# Patient Record
Sex: MALE | Race: WHITE | NOT HISPANIC OR LATINO | Employment: STUDENT | URBAN - METROPOLITAN AREA
[De-identification: names, ages, dates, MRNs, and addresses within clinical notes are randomized per-mention and may not be internally consistent; named-entity substitution may affect disease eponyms.]

---

## 2024-06-03 ENCOUNTER — OFFICE VISIT (OUTPATIENT)
Dept: FAMILY MEDICINE CLINIC | Facility: CLINIC | Age: 19
End: 2024-06-03
Payer: COMMERCIAL

## 2024-06-03 VITALS
HEIGHT: 71 IN | WEIGHT: 195.2 LBS | SYSTOLIC BLOOD PRESSURE: 111 MMHG | DIASTOLIC BLOOD PRESSURE: 75 MMHG | TEMPERATURE: 98.2 F | BODY MASS INDEX: 27.33 KG/M2 | OXYGEN SATURATION: 97 % | HEART RATE: 90 BPM

## 2024-06-03 DIAGNOSIS — Z00.01 ENCOUNTER FOR ANNUAL GENERAL MEDICAL EXAMINATION WITH ABNORMAL FINDINGS IN ADULT: Primary | ICD-10-CM

## 2024-06-03 DIAGNOSIS — Z13.220 SCREENING CHOLESTEROL LEVEL: ICD-10-CM

## 2024-06-03 DIAGNOSIS — R00.0 TACHYCARDIA, UNSPECIFIED: ICD-10-CM

## 2024-06-03 DIAGNOSIS — Z13.1 SCREENING FOR DIABETES MELLITUS: ICD-10-CM

## 2024-06-03 DIAGNOSIS — D64.89 ANEMIA DUE TO OTHER CAUSE, NOT CLASSIFIED: ICD-10-CM

## 2024-06-03 DIAGNOSIS — Z23 NEED FOR VACCINATION: ICD-10-CM

## 2024-06-03 DIAGNOSIS — Z11.4 SCREENING FOR HIV (HUMAN IMMUNODEFICIENCY VIRUS): ICD-10-CM

## 2024-06-03 DIAGNOSIS — Z11.59 NEED FOR HEPATITIS C SCREENING TEST: ICD-10-CM

## 2024-06-03 DIAGNOSIS — E55.9 VITAMIN D DEFICIENCY: ICD-10-CM

## 2024-06-03 DIAGNOSIS — K58.0 IRRITABLE BOWEL SYNDROME WITH DIARRHEA: ICD-10-CM

## 2024-06-03 DIAGNOSIS — R11.0 NAUSEA: ICD-10-CM

## 2024-06-03 DIAGNOSIS — R19.7 DIARRHEA, UNSPECIFIED TYPE: ICD-10-CM

## 2024-06-03 PROBLEM — R53.83 FATIGUE: Status: ACTIVE | Noted: 2024-06-03

## 2024-06-03 PROBLEM — H52.10 MYOPIA: Status: ACTIVE | Noted: 2017-04-26

## 2024-06-03 PROCEDURE — 99385 PREV VISIT NEW AGE 18-39: CPT | Performed by: PREVENTIVE MEDICINE

## 2024-06-03 PROCEDURE — 99213 OFFICE O/P EST LOW 20 MIN: CPT | Performed by: PREVENTIVE MEDICINE

## 2024-06-03 NOTE — PATIENT INSTRUCTIONS
Health Maintenance Due   Topic Date Due    HPV VACCINES (3 - Male 2-dose series) 01/18/2017    COVID-19 Vaccine (4 - 2023-24 season) 09/01/2023    HEPATITIS C SCREENING  Never done    ANNUAL PHYSICAL  Never done    12 hour fast for labs

## 2024-06-03 NOTE — PROGRESS NOTES
Subjective   César Tejada is a 18 y.o. male presents for   Chief Complaint   Patient presents with    Annual Exam     Concerned with anemia has never seen Gastro    Establish Care    Diarrhea     Concerned with IBS       Health Maintenance Due   Topic Date Due    HPV VACCINES (3 - Male 2-dose series) 01/18/2017    COVID-19 Vaccine (4 - 2023-24 season) 09/01/2023    HEPATITIS C SCREENING  Never done    ANNUAL PHYSICAL  Never done   Patient was new to us for establishing care and age-specific physical he was advised to wear sunscreen and a seatbelt.    Patient also is complaining of several.'s of diarrhea without blood in his stools he thought at first that it was just at times when he was anxious but now he realizes that it is occurring most of the time he has not had any weight loss there is no family history of IBS but he would like to see the gastroenterologist to see if he could get a diagnosis he has tried to cut various things like gluten lactose carbohydrates out of his diet and this is really not seem to make much difference he is also try to increase his fiber in the form of spinach and this has made no progress as well in the meantime he will try to increase fiber in the form of fiber bars to see if that will decrease his diarrhea.  We will also get some labs and if we fail to find a cause for his problems will consider due to his chest pain referring him to cardiology.    Diarrhea        History of Present Illness  The patient is a 18-year-old male who is here today to establish care for his annual wellness exam, nausea, diarrhea, need for hepatitis C screening, need for vaccination, screening for HIV, screening for cholesterol, diabetes, deficiency of vitamin D, also anemia and irritable bowel syndrome, and also body mass index of 27.    The patient reports experiencing diarrhea, nausea, and gas, which have progressively worsened over the past year. He denies any alterations in hearing or vision since  "his last medical consultation. His prescription has remained consistent, and he denies any sore throat. He denies any constipation, hematochezia, or melena. He suspects he may have irritable bowel syndrome, although he has not been formally diagnosed or consulted a gastroenterologist. His condition has worsened compared to the previous year. He attempted to increase his fiber intake during his college years, but to no avail. He also reports frequent chills, gas, indigestion, and nausea. He suspects he may have anemia due to frequent diarrhea, fatigue, clamminess, and tachycardia, which he first noticed a few years ago. He initially attributed these symptoms to stress, but later realized that they occur even when he is not under stress. He denies any chest pressure or palpitations. He has never required hospitalization, but has undergone two surgeries. He underwent an osteochondroma removal from his left underarm when he was 11 years old, and had his wisdom teeth removed last summer. He has never received a blood transfusion. He used to exercise, but currently does not due to fatigue that began in 12/2023.   His father is 45 and in good health. His mother is 47 in fair health. She has likely a hiatal hernia, but they have not gotten that out yet. He has 1 sister who is 17 and in good health. He denies any family history of heart disease.   The patient has no known drug allergies.   He is up-to-date on his tetanus vaccine.    Vitals:    06/03/24 1403 06/03/24 1404   BP: 118/79 111/75   BP Location: Left arm Right arm   Patient Position: Sitting Sitting   Cuff Size: Adult Adult   Pulse: 82 90   Temp: 98.2 °F (36.8 °C)    TempSrc: Temporal    SpO2: 97% 97%   Weight: 88.5 kg (195 lb 3.2 oz)    Height: 180.3 cm (71\")      Body mass index is 27.22 kg/m².    No current outpatient medications on file prior to visit.     No current facility-administered medications on file prior to visit.       The following portions of the " patient's history were reviewed and updated as appropriate: allergies, current medications, past family history, past medical history, past social history, past surgical history, and problem list.    Review of Systems   Respiratory:  Positive for chest tightness.    Cardiovascular:  Positive for chest pain.   Gastrointestinal:  Positive for diarrhea, nausea, GERD and indigestion.       Objective   Physical Exam  Vitals reviewed.   Constitutional:       General: He is not in acute distress.     Appearance: Normal appearance. He is well-developed. He is not ill-appearing or toxic-appearing.   HENT:      Head: Normocephalic and atraumatic.      Right Ear: Tympanic membrane, ear canal and external ear normal.      Left Ear: Tympanic membrane, ear canal and external ear normal.      Nose: Nose normal.      Mouth/Throat:      Mouth: Mucous membranes are moist.      Pharynx: No posterior oropharyngeal erythema.   Eyes:      Extraocular Movements: Extraocular movements intact.      Conjunctiva/sclera: Conjunctivae normal.      Pupils: Pupils are equal, round, and reactive to light.   Cardiovascular:      Rate and Rhythm: Normal rate and regular rhythm.      Heart sounds: Normal heart sounds.   Pulmonary:      Effort: Pulmonary effort is normal.      Breath sounds: Normal breath sounds.   Abdominal:      General: Bowel sounds are normal. There is no distension.      Palpations: Abdomen is soft. There is no mass.      Tenderness: There is no abdominal tenderness.   Musculoskeletal:         General: Normal range of motion.      Cervical back: Neck supple.   Skin:     General: Skin is warm.   Neurological:      General: No focal deficit present.      Mental Status: He is alert and oriented to person, place, and time.   Psychiatric:         Mood and Affect: Mood normal.         Behavior: Behavior normal.       Physical Exam  Vital Signs  Body mass index is 27.    PHQ-9 Total Score: 0  Results           Assessment & Plan    Diagnoses and all orders for this visit:    1. Encounter for annual general medical examination with abnormal findings in adult (Primary)  -     CBC Auto Differential; Future    2. Need for vaccination    3. Need for hepatitis C screening test  -     Hepatitis C Antibody; Future    4. Diarrhea, unspecified type    5. Nausea    6. Screening for HIV (human immunodeficiency virus)  -     HIV-1 / O / 2 Ag / Antibody; Future    7. Screening cholesterol level  -     Lipid Panel; Future    8. Screening for diabetes mellitus  -     Comprehensive Metabolic Panel; Future    9. Vitamin D deficiency  -     Vitamin D,25-Hydroxy; Future    10. Anemia due to other cause, not classified  -     Vitamin B12; Future    11. Irritable bowel syndrome with diarrhea  -     Ambulatory Referral to Gastroenterology    12. Body mass index (BMI) 27.0-27.9, adult    13. Tachycardia, unspecified  -     TSH; Future  -     Magnesium; Future  -     C-reactive Protein; Future  -     Sedimentation Rate; Future      Assessment & Plan  1. Irritable bowel syndrome.  A referral to a gastroenterologist has been made for further evaluation.    2. Fatigue and high resting heart rate.  Thyroid, B12, magnesium, C-reactive protein, and sedimentation rate tests have been ordered.    Follow-up  A follow-up appointment is scheduled for 6 weeks from now.    Patient Instructions     Health Maintenance Due   Topic Date Due    HPV VACCINES (3 - Male 2-dose series) 01/18/2017    COVID-19 Vaccine (4 - 2023-24 season) 09/01/2023    HEPATITIS C SCREENING  Never done    ANNUAL PHYSICAL  Never done    12 hour fast for labs         Patient or patient representative verbalized consent for the use of Ambient Listening during the visit with  Gabriela Carvajal MD for chart documentation. 6/3/2024  20:01 EDT

## 2024-07-19 ENCOUNTER — LAB (OUTPATIENT)
Dept: FAMILY MEDICINE CLINIC | Facility: CLINIC | Age: 19
End: 2024-07-19
Payer: COMMERCIAL

## 2024-07-19 DIAGNOSIS — D64.89 ANEMIA DUE TO OTHER CAUSE, NOT CLASSIFIED: ICD-10-CM

## 2024-07-19 DIAGNOSIS — Z13.220 SCREENING CHOLESTEROL LEVEL: ICD-10-CM

## 2024-07-19 DIAGNOSIS — Z00.01 ENCOUNTER FOR ANNUAL GENERAL MEDICAL EXAMINATION WITH ABNORMAL FINDINGS IN ADULT: ICD-10-CM

## 2024-07-19 DIAGNOSIS — E55.9 VITAMIN D DEFICIENCY: ICD-10-CM

## 2024-07-19 DIAGNOSIS — Z13.1 SCREENING FOR DIABETES MELLITUS: ICD-10-CM

## 2024-07-19 DIAGNOSIS — R00.0 TACHYCARDIA, UNSPECIFIED: ICD-10-CM

## 2024-07-19 DIAGNOSIS — Z11.4 SCREENING FOR HIV (HUMAN IMMUNODEFICIENCY VIRUS): ICD-10-CM

## 2024-07-19 LAB
ALBUMIN SERPL-MCNC: 4.6 G/DL (ref 3.5–5.2)
ALBUMIN/GLOB SERPL: 1.8 G/DL
ALP SERPL-CCNC: 96 U/L (ref 39–117)
ALT SERPL W P-5'-P-CCNC: 41 U/L (ref 1–41)
ANION GAP SERPL CALCULATED.3IONS-SCNC: 10 MMOL/L (ref 5–15)
AST SERPL-CCNC: 30 U/L (ref 1–40)
BASOPHILS # BLD AUTO: 0.04 10*3/MM3 (ref 0–0.2)
BASOPHILS NFR BLD AUTO: 0.8 % (ref 0–1.5)
BILIRUB SERPL-MCNC: 0.8 MG/DL (ref 0–1.2)
BUN SERPL-MCNC: 12 MG/DL (ref 6–20)
BUN/CREAT SERPL: 11.3 (ref 7–25)
CALCIUM SPEC-SCNC: 9.7 MG/DL (ref 8.6–10.5)
CHLORIDE SERPL-SCNC: 103 MMOL/L (ref 98–107)
CHOLEST SERPL-MCNC: 125 MG/DL (ref 0–200)
CO2 SERPL-SCNC: 27 MMOL/L (ref 22–29)
CREAT SERPL-MCNC: 1.06 MG/DL (ref 0.76–1.27)
CRP SERPL-MCNC: <0.3 MG/DL (ref 0–0.5)
DEPRECATED RDW RBC AUTO: 39 FL (ref 37–54)
EGFRCR SERPLBLD CKD-EPI 2021: 103.7 ML/MIN/1.73
EOSINOPHIL # BLD AUTO: 0.08 10*3/MM3 (ref 0–0.4)
EOSINOPHIL NFR BLD AUTO: 1.5 % (ref 0.3–6.2)
ERYTHROCYTE [DISTWIDTH] IN BLOOD BY AUTOMATED COUNT: 12.6 % (ref 12.3–15.4)
ERYTHROCYTE [SEDIMENTATION RATE] IN BLOOD: <1 MM/HR (ref 0–15)
GLOBULIN UR ELPH-MCNC: 2.5 GM/DL
GLUCOSE SERPL-MCNC: 76 MG/DL (ref 65–99)
HCT VFR BLD AUTO: 50.1 % (ref 37.5–51)
HDLC SERPL-MCNC: 49 MG/DL (ref 40–60)
HGB BLD-MCNC: 17.4 G/DL (ref 13–17.7)
IMM GRANULOCYTES # BLD AUTO: 0.01 10*3/MM3 (ref 0–0.05)
IMM GRANULOCYTES NFR BLD AUTO: 0.2 % (ref 0–0.5)
LDLC SERPL CALC-MCNC: 62 MG/DL (ref 0–100)
LDLC/HDLC SERPL: 1.28 {RATIO}
LYMPHOCYTES # BLD AUTO: 2.39 10*3/MM3 (ref 0.7–3.1)
LYMPHOCYTES NFR BLD AUTO: 45.6 % (ref 19.6–45.3)
MAGNESIUM SERPL-MCNC: 2.5 MG/DL (ref 1.7–2.2)
MCH RBC QN AUTO: 30.1 PG (ref 26.6–33)
MCHC RBC AUTO-ENTMCNC: 34.7 G/DL (ref 31.5–35.7)
MCV RBC AUTO: 86.5 FL (ref 79–97)
MONOCYTES # BLD AUTO: 0.53 10*3/MM3 (ref 0.1–0.9)
MONOCYTES NFR BLD AUTO: 10.1 % (ref 5–12)
NEUTROPHILS NFR BLD AUTO: 2.19 10*3/MM3 (ref 1.7–7)
NEUTROPHILS NFR BLD AUTO: 41.8 % (ref 42.7–76)
NRBC BLD AUTO-RTO: 0 /100 WBC (ref 0–0.2)
PLATELET # BLD AUTO: 199 10*3/MM3 (ref 140–450)
PMV BLD AUTO: 11.2 FL (ref 6–12)
POTASSIUM SERPL-SCNC: 4.1 MMOL/L (ref 3.5–5.2)
PROT SERPL-MCNC: 7.1 G/DL (ref 6–8.5)
RBC # BLD AUTO: 5.79 10*6/MM3 (ref 4.14–5.8)
SODIUM SERPL-SCNC: 140 MMOL/L (ref 136–145)
TRIGL SERPL-MCNC: 66 MG/DL (ref 0–150)
TSH SERPL DL<=0.05 MIU/L-ACNC: 5.33 UIU/ML (ref 0.27–4.2)
VLDLC SERPL-MCNC: 14 MG/DL (ref 5–40)
WBC NRBC COR # BLD AUTO: 5.24 10*3/MM3 (ref 3.4–10.8)

## 2024-07-19 PROCEDURE — 83735 ASSAY OF MAGNESIUM: CPT | Performed by: PREVENTIVE MEDICINE

## 2024-07-19 PROCEDURE — 86140 C-REACTIVE PROTEIN: CPT | Performed by: PREVENTIVE MEDICINE

## 2024-07-19 PROCEDURE — 84439 ASSAY OF FREE THYROXINE: CPT | Performed by: PREVENTIVE MEDICINE

## 2024-07-19 PROCEDURE — 80061 LIPID PANEL: CPT | Performed by: PREVENTIVE MEDICINE

## 2024-07-19 PROCEDURE — 85025 COMPLETE CBC W/AUTO DIFF WBC: CPT | Performed by: PREVENTIVE MEDICINE

## 2024-07-19 PROCEDURE — 82306 VITAMIN D 25 HYDROXY: CPT | Performed by: PREVENTIVE MEDICINE

## 2024-07-19 PROCEDURE — 84443 ASSAY THYROID STIM HORMONE: CPT | Performed by: PREVENTIVE MEDICINE

## 2024-07-19 PROCEDURE — 80053 COMPREHEN METABOLIC PANEL: CPT | Performed by: PREVENTIVE MEDICINE

## 2024-07-19 PROCEDURE — 85652 RBC SED RATE AUTOMATED: CPT | Performed by: PREVENTIVE MEDICINE

## 2024-07-19 PROCEDURE — 82607 VITAMIN B-12: CPT | Performed by: PREVENTIVE MEDICINE

## 2024-07-19 PROCEDURE — G0432 EIA HIV-1/HIV-2 SCREEN: HCPCS | Performed by: PREVENTIVE MEDICINE

## 2024-07-20 DIAGNOSIS — E03.9 HYPOTHYROIDISM, UNSPECIFIED TYPE: Primary | ICD-10-CM

## 2024-07-20 LAB
25(OH)D3 SERPL-MCNC: 23.6 NG/ML (ref 30–100)
HIV 1+2 AB+HIV1 P24 AG SERPL QL IA: NORMAL
T4 FREE SERPL-MCNC: 1.45 NG/DL (ref 0.92–1.68)
VIT B12 BLD-MCNC: 523 PG/ML (ref 211–946)

## 2024-07-21 NOTE — PROGRESS NOTES
Vitamin D is slightly low so increase to 1000 units a day or 2/week.  Magnesium is slightly elevated if you are taking extra over-the-counter I would decrease your dose a day or 2/week finally your TSH is elevated but the active ingredient called T4 is normal so we will not consider any thyroid medication presently however this should be monitored again in 3 to 6 months call if any other questions or concerns.

## 2024-07-22 ENCOUNTER — TELEPHONE (OUTPATIENT)
Dept: FAMILY MEDICINE CLINIC | Facility: CLINIC | Age: 19
End: 2024-07-22
Payer: COMMERCIAL

## 2024-07-22 NOTE — TELEPHONE ENCOUNTER
"Relay     \"Vitamin D is slightly low so increase to 1000 units a day or 2/week.  Magnesium is slightly elevated if you are taking extra over-the-counter I would decrease your dose a day or 2/week finally your TSH is elevated but the active ingredient called T4 is normal so we will not consider any thyroid medication presently however this should be monitored again in 3 to 6 months call if any other questions or concerns.\"                "

## 2024-07-22 NOTE — TELEPHONE ENCOUNTER
Patient advised of lab results. Verbalized understanding. No questions or concerns at this time.

## 2024-08-06 ENCOUNTER — OFFICE (OUTPATIENT)
Dept: URBAN - METROPOLITAN AREA CLINIC 64 | Facility: CLINIC | Age: 19
End: 2024-08-06
Payer: COMMERCIAL

## 2024-08-06 VITALS
WEIGHT: 188 LBS | DIASTOLIC BLOOD PRESSURE: 95 MMHG | HEART RATE: 87 BPM | BODY MASS INDEX: 26.32 KG/M2 | HEIGHT: 71 IN | SYSTOLIC BLOOD PRESSURE: 145 MMHG

## 2024-08-06 DIAGNOSIS — R19.7 DIARRHEA, UNSPECIFIED: ICD-10-CM

## 2024-08-06 PROCEDURE — 99203 OFFICE O/P NEW LOW 30 MIN: CPT | Performed by: INTERNAL MEDICINE

## 2024-08-06 RX ORDER — LOPERAMIDE HCL 2 MG
2 TABLET ORAL
Qty: 30 | Refills: 11 | Status: ACTIVE
Start: 2024-08-06

## 2024-08-08 PROBLEM — E83.41 HYPERMAGNESEMIA: Status: ACTIVE | Noted: 2024-08-08

## 2024-08-08 PROBLEM — E55.9 VITAMIN D DEFICIENCY: Status: ACTIVE | Noted: 2024-08-08

## 2024-08-08 NOTE — PATIENT INSTRUCTIONS
Health Maintenance Due   Topic Date Due    HPV VACCINES (3 - Male 2-dose series) 01/18/2017    COVID-19 Vaccine (4 - 2023-24 season) 09/01/2023    HEPATITIS C SCREENING  Never done    INFLUENZA VACCINE  08/01/2024

## 2024-08-09 ENCOUNTER — OFFICE VISIT (OUTPATIENT)
Dept: FAMILY MEDICINE CLINIC | Facility: CLINIC | Age: 19
End: 2024-08-09
Payer: COMMERCIAL

## 2024-08-09 VITALS
OXYGEN SATURATION: 96 % | DIASTOLIC BLOOD PRESSURE: 76 MMHG | HEART RATE: 85 BPM | TEMPERATURE: 97 F | WEIGHT: 185.6 LBS | HEIGHT: 71 IN | BODY MASS INDEX: 25.98 KG/M2 | SYSTOLIC BLOOD PRESSURE: 109 MMHG

## 2024-08-09 DIAGNOSIS — E55.9 VITAMIN D DEFICIENCY: ICD-10-CM

## 2024-08-09 DIAGNOSIS — R00.0 TACHYCARDIA, UNSPECIFIED: ICD-10-CM

## 2024-08-09 DIAGNOSIS — R53.83 OTHER FATIGUE: ICD-10-CM

## 2024-08-09 DIAGNOSIS — K58.0 IRRITABLE BOWEL SYNDROME WITH DIARRHEA: Primary | ICD-10-CM

## 2024-08-09 DIAGNOSIS — E66.3 OVERWEIGHT WITH BODY MASS INDEX (BMI) OF 25 TO 25.9 IN ADULT: ICD-10-CM

## 2024-08-09 DIAGNOSIS — Z01.83 ENCOUNTER FOR BLOOD TYPING: ICD-10-CM

## 2024-08-09 DIAGNOSIS — E83.41 HYPERMAGNESEMIA: ICD-10-CM

## 2024-08-09 PROCEDURE — 99213 OFFICE O/P EST LOW 20 MIN: CPT | Performed by: PREVENTIVE MEDICINE

## 2024-08-09 RX ORDER — LOPERAMIDE HYDROCHLORIDE 2 MG/1
2 TABLET ORAL
COMMUNITY
Start: 2024-08-06

## 2024-08-10 NOTE — PROGRESS NOTES
"Jenna Tejada is a 18 y.o. male presents for   Chief Complaint   Patient presents with    Irritable Bowel Syndrome     Has improved.  GI put on new medications.         Health Maintenance Due   Topic Date Due    HPV VACCINES (3 - Male 2-dose series) 01/18/2017    COVID-19 Vaccine (4 - 2023-24 season) 09/01/2023    HEPATITIS C SCREENING  Never done    INFLUENZA VACCINE  08/01/2024       Irritable Bowel Syndrome  Pertinent negatives include no fatigue.      History of Present Illness  The patient is a 18-year-old male who is here today to follow up on irritable bowel syndrome with diarrhea, hypermagnesemia, vitamin D deficiency, tachycardia, fatigue, and overweight with a body mass index of 25.    He has been prescribed Imodium, which he reports has been effective in managing his symptoms. He has been advised to continue Imodium, and he has been adhering to a healthier diet. He has noticed a slight weight loss, which he attributes to resuming work. He denies any presence of blood in his stools.    Vitals:    08/09/24 1341 08/09/24 1342   BP: 110/76 109/76   BP Location: Right arm Left arm   Patient Position: Sitting Sitting   Cuff Size: Adult Adult   Pulse: 76 85   Temp: 97 °F (36.1 °C)    TempSrc: Temporal    SpO2: 96% 96%   Weight: 84.2 kg (185 lb 9.6 oz)    Height: 180.3 cm (71\")      Body mass index is 25.89 kg/m².    Current Outpatient Medications on File Prior to Visit   Medication Sig Dispense Refill    loperamide (IMODIUM A-D) 2 MG tablet Take 1 tablet by mouth.      VITAMIN D, CHOLECALCIFEROL, PO Take  by mouth.       No current facility-administered medications on file prior to visit.       The following portions of the patient's history were reviewed and updated as appropriate: allergies, current medications, past family history, past medical history, past social history, past surgical history, and problem list.    Review of Systems   Constitutional:  Negative for fatigue.   Cardiovascular:  " Positive for palpitations.   Gastrointestinal:  Negative for anal bleeding, diarrhea and indigestion.       Objective   Physical Exam  Vitals reviewed.   Constitutional:       General: He is not in acute distress.     Appearance: Normal appearance. He is well-developed. He is not ill-appearing or toxic-appearing.   HENT:      Head: Normocephalic and atraumatic.      Nose: Nose normal.      Mouth/Throat:      Mouth: Mucous membranes are moist.      Pharynx: No posterior oropharyngeal erythema.   Eyes:      Extraocular Movements: Extraocular movements intact.      Conjunctiva/sclera: Conjunctivae normal.      Pupils: Pupils are equal, round, and reactive to light.   Cardiovascular:      Rate and Rhythm: Normal rate and regular rhythm.   Pulmonary:      Effort: Pulmonary effort is normal.      Breath sounds: Normal breath sounds.   Abdominal:      General: There is no distension.      Palpations: There is no mass.      Tenderness: There is no abdominal tenderness. There is no right CVA tenderness or left CVA tenderness.   Neurological:      Mental Status: He is alert and oriented to person, place, and time.   Psychiatric:         Mood and Affect: Mood normal.         Behavior: Behavior normal.       Physical Exam  Vital Signs  Body mass index is 25.    PHQ-9 Total Score:    Results  Laboratory Studies  Vitamin D was low. Thyroid stimulating hormone was elevated but T4 was normal. Blood count was normal, with a few less neutrophils and a few more lymphocytes.         Assessment & Plan   Diagnoses and all orders for this visit:    1. Irritable bowel syndrome with diarrhea (Primary)    2. Overweight with body mass index (BMI) of 25 to 25.9 in adult    3. Other fatigue  -     CBC Auto Differential    4. Tachycardia, unspecified  -     TSH Rfx On Abnormal To Free T4    5. Hypermagnesemia    6. Vitamin D deficiency  -     Vitamin D,25-Hydroxy    7. Encounter for blood typing  -     ABO/Rh; Future      Assessment & Plan  1.  Irritable bowel syndrome with diarrhea.  Continuation of Imodium is advised, with the understanding that it may cause constipation.    2. Vitamin D deficiency.  Vitamin D levels will be reassessed in December 2024.    3. Health maintenance.  Thyroid and vitamin D levels will be reassessed in December 2024. A Complete Blood Count (CBC) will also be conducted.    Follow-up  A follow-up appointment is scheduled for 4 months from now.    Patient Instructions     Health Maintenance Due   Topic Date Due    HPV VACCINES (3 - Male 2-dose series) 01/18/2017    COVID-19 Vaccine (4 - 2023-24 season) 09/01/2023    HEPATITIS C SCREENING  Never done    INFLUENZA VACCINE  08/01/2024           Patient or patient representative verbalized consent for the use of Ambient Listening during the visit with  Gabriela Carvajal MD for chart documentation. 8/10/2024  09:08 EDT

## 2024-12-27 ENCOUNTER — LAB (OUTPATIENT)
Dept: FAMILY MEDICINE CLINIC | Facility: CLINIC | Age: 19
End: 2024-12-27
Payer: COMMERCIAL

## 2024-12-27 DIAGNOSIS — Z01.83 ENCOUNTER FOR BLOOD TYPING: ICD-10-CM

## 2024-12-27 LAB
25(OH)D3 SERPL-MCNC: 25.2 NG/ML (ref 30–100)
ABO GROUP BLD: NORMAL
BASOPHILS # BLD AUTO: 0.05 10*3/MM3 (ref 0–0.2)
BASOPHILS NFR BLD AUTO: 0.9 % (ref 0–1.5)
DEPRECATED RDW RBC AUTO: 37.7 FL (ref 37–54)
EOSINOPHIL # BLD AUTO: 0.11 10*3/MM3 (ref 0–0.4)
EOSINOPHIL NFR BLD AUTO: 1.9 % (ref 0.3–6.2)
ERYTHROCYTE [DISTWIDTH] IN BLOOD BY AUTOMATED COUNT: 12.5 % (ref 12.3–15.4)
HCT VFR BLD AUTO: 49.1 % (ref 37.5–51)
HGB BLD-MCNC: 17.5 G/DL (ref 13–17.7)
IMM GRANULOCYTES # BLD AUTO: 0.02 10*3/MM3 (ref 0–0.05)
IMM GRANULOCYTES NFR BLD AUTO: 0.3 % (ref 0–0.5)
LYMPHOCYTES # BLD AUTO: 2.15 10*3/MM3 (ref 0.7–3.1)
LYMPHOCYTES NFR BLD AUTO: 36.7 % (ref 19.6–45.3)
MCH RBC QN AUTO: 30 PG (ref 26.6–33)
MCHC RBC AUTO-ENTMCNC: 35.6 G/DL (ref 31.5–35.7)
MCV RBC AUTO: 84.2 FL (ref 79–97)
MONOCYTES # BLD AUTO: 0.55 10*3/MM3 (ref 0.1–0.9)
MONOCYTES NFR BLD AUTO: 9.4 % (ref 5–12)
NEUTROPHILS NFR BLD AUTO: 2.98 10*3/MM3 (ref 1.7–7)
NEUTROPHILS NFR BLD AUTO: 50.8 % (ref 42.7–76)
NRBC BLD AUTO-RTO: 0 /100 WBC (ref 0–0.2)
PLATELET # BLD AUTO: 210 10*3/MM3 (ref 140–450)
PMV BLD AUTO: 10.3 FL (ref 6–12)
RBC # BLD AUTO: 5.83 10*6/MM3 (ref 4.14–5.8)
RH BLD: POSITIVE
TSH SERPL DL<=0.05 MIU/L-ACNC: 2.53 UIU/ML (ref 0.27–4.2)
WBC NRBC COR # BLD AUTO: 5.86 10*3/MM3 (ref 3.4–10.8)

## 2024-12-27 PROCEDURE — 86901 BLOOD TYPING SEROLOGIC RH(D): CPT | Performed by: PREVENTIVE MEDICINE

## 2024-12-27 PROCEDURE — 86900 BLOOD TYPING SEROLOGIC ABO: CPT | Performed by: PREVENTIVE MEDICINE

## 2024-12-27 PROCEDURE — 85025 COMPLETE CBC W/AUTO DIFF WBC: CPT | Performed by: PREVENTIVE MEDICINE

## 2024-12-27 PROCEDURE — 84443 ASSAY THYROID STIM HORMONE: CPT | Performed by: PREVENTIVE MEDICINE

## 2024-12-27 PROCEDURE — 82306 VITAMIN D 25 HYDROXY: CPT | Performed by: PREVENTIVE MEDICINE

## 2024-12-29 ENCOUNTER — TELEPHONE (OUTPATIENT)
Dept: FAMILY MEDICINE CLINIC | Facility: CLINIC | Age: 19
End: 2024-12-29
Payer: COMMERCIAL

## 2024-12-29 NOTE — TELEPHONE ENCOUNTER
Sent message through [employer] TO RELAY ----- Message from Gabriela Carvajal sent at 12/28/2024  8:53 AM EST -----  Blood type is a positive and the rest of the labs show low vitamin D so increase of 1000 units daily over-the-counter call if any other questions or concerns

## 2024-12-30 ENCOUNTER — OFFICE (OUTPATIENT)
Dept: URBAN - METROPOLITAN AREA CLINIC 64 | Facility: CLINIC | Age: 19
End: 2024-12-30
Payer: COMMERCIAL

## 2024-12-30 VITALS
DIASTOLIC BLOOD PRESSURE: 67 MMHG | WEIGHT: 200 LBS | SYSTOLIC BLOOD PRESSURE: 125 MMHG | HEART RATE: 77 BPM | HEIGHT: 71 IN

## 2024-12-30 DIAGNOSIS — R19.7 DIARRHEA, UNSPECIFIED: ICD-10-CM

## 2024-12-30 PROCEDURE — 99213 OFFICE O/P EST LOW 20 MIN: CPT | Performed by: INTERNAL MEDICINE

## 2024-12-30 NOTE — PATIENT INSTRUCTIONS
Health Maintenance Due   Topic Date Due    HPV VACCINES (3 - Male 2-dose series) 01/18/2017    HEPATITIS C SCREENING  Never done    INFLUENZA VACCINE  07/01/2024    COVID-19 Vaccine (4 - 2024-25 season) 09/01/2024

## 2024-12-31 ENCOUNTER — OFFICE VISIT (OUTPATIENT)
Dept: FAMILY MEDICINE CLINIC | Facility: CLINIC | Age: 19
End: 2024-12-31
Payer: COMMERCIAL

## 2024-12-31 VITALS
WEIGHT: 199 LBS | TEMPERATURE: 98 F | SYSTOLIC BLOOD PRESSURE: 103 MMHG | OXYGEN SATURATION: 98 % | BODY MASS INDEX: 27.86 KG/M2 | DIASTOLIC BLOOD PRESSURE: 72 MMHG | HEIGHT: 71 IN | HEART RATE: 83 BPM

## 2024-12-31 DIAGNOSIS — R00.0 TACHYCARDIA, UNSPECIFIED: ICD-10-CM

## 2024-12-31 DIAGNOSIS — Z91.89 ENCOUNTER FOR HEPATITIS C VIRUS SCREENING TEST FOR HIGH RISK PATIENT: ICD-10-CM

## 2024-12-31 DIAGNOSIS — E55.9 VITAMIN D DEFICIENCY: ICD-10-CM

## 2024-12-31 DIAGNOSIS — E66.3 OVERWEIGHT WITH BODY MASS INDEX (BMI) OF 27 TO 27.9 IN ADULT: ICD-10-CM

## 2024-12-31 DIAGNOSIS — Z11.59 ENCOUNTER FOR HEPATITIS C SCREENING TEST FOR LOW RISK PATIENT: ICD-10-CM

## 2024-12-31 DIAGNOSIS — Z11.59 ENCOUNTER FOR HEPATITIS C VIRUS SCREENING TEST FOR HIGH RISK PATIENT: ICD-10-CM

## 2024-12-31 DIAGNOSIS — R53.83 OTHER FATIGUE: Primary | ICD-10-CM

## 2024-12-31 PROCEDURE — 99214 OFFICE O/P EST MOD 30 MIN: CPT | Performed by: PREVENTIVE MEDICINE

## 2024-12-31 NOTE — PROGRESS NOTES
"Subjective   César Tejada is a 19 y.o. male presents for   Chief Complaint   Patient presents with    Follow-up     4 month       Health Maintenance Due   Topic Date Due    HPV VACCINES (3 - Male 2-dose series) 01/18/2017    HEPATITIS C SCREENING  Never done    COVID-19 Vaccine (4 - 2024-25 season) 09/01/2024       History of Present Illness   History of Present Illness  The patient is a 19-year-old female who is here today to follow up on fatigue, tachycardia, hepatitis C screening, vitamin D deficiency, and overweight with a body mass index of 27.    She reports an improvement in her fatigue levels compared to the previous year, attributing this to her current residence with her family during the holiday season and not eating campus food. She has been experiencing illness for the past month, which she believes was contracted from her mother, who had a similar duration of illness.    She has not undergone screening for hepatitis C. She is uncertain about her vaccination status but believes she may be due for influenza and COVID-19 vaccines, which she plans to receive at [facility].    She continues to use Imodium as needed, following consultation with a gastroenterologist who advised this medication could be used as required in case of bloating.    She has been seeing a therapist for her anxiety, which she thinks has helped with her pulse rate.    SOCIAL HISTORY  She is currently home from college for the holidays.    ALLERGIES  The patient has no known drug allergies.    MEDICATIONS  Imodium.    IMMUNIZATIONS  She is due for influenza and COVID-19 vaccines.    Vitals:    12/31/24 1404 12/31/24 1407   BP: 114/73 103/72   BP Location: Left arm Right arm   Patient Position: Sitting Sitting   Cuff Size: Large Adult Large Adult   Pulse: 97 83   Temp: 98 °F (36.7 °C)    TempSrc: Temporal    SpO2: 98%    Weight: 90.3 kg (199 lb)    Height: 180.3 cm (71\")      Body mass index is 27.75 kg/m².    Current Outpatient " Medications on File Prior to Visit   Medication Sig Dispense Refill    loperamide (IMODIUM A-D) 2 MG tablet Take 1 tablet by mouth.      VITAMIN D, CHOLECALCIFEROL, PO Take  by mouth.       No current facility-administered medications on file prior to visit.       The following portions of the patient's history were reviewed and updated as appropriate: allergies, current medications, past family history, past medical history, past social history, past surgical history, and problem list.    Review of Systems   Constitutional:  Negative for fatigue.   Cardiovascular:  Positive for palpitations.       Objective   Physical Exam  Vitals reviewed.   Constitutional:       General: He is not in acute distress.     Appearance: Normal appearance. He is well-developed. He is not ill-appearing or toxic-appearing.   HENT:      Head: Normocephalic and atraumatic.      Right Ear: Tympanic membrane, ear canal and external ear normal.      Left Ear: Tympanic membrane, ear canal and external ear normal.      Nose: Nose normal.      Mouth/Throat:      Mouth: Mucous membranes are moist.      Pharynx: No posterior oropharyngeal erythema.   Eyes:      Extraocular Movements: Extraocular movements intact.      Conjunctiva/sclera: Conjunctivae normal.      Pupils: Pupils are equal, round, and reactive to light.   Neck:      Vascular: No carotid bruit.   Cardiovascular:      Rate and Rhythm: Regular rhythm. Tachycardia present.      Heart sounds: Normal heart sounds.   Pulmonary:      Effort: Pulmonary effort is normal.      Breath sounds: Normal breath sounds.   Abdominal:      General: Bowel sounds are normal. There is no distension.      Palpations: Abdomen is soft. There is no mass.      Tenderness: There is no abdominal tenderness. There is no right CVA tenderness or left CVA tenderness.   Musculoskeletal:         General: Normal range of motion.      Cervical back: Neck supple. No tenderness.      Right lower leg: No edema.      Left  lower leg: No edema.   Lymphadenopathy:      Cervical: No cervical adenopathy.   Skin:     General: Skin is warm.   Neurological:      General: No focal deficit present.      Mental Status: He is alert and oriented to person, place, and time.   Psychiatric:         Mood and Affect: Mood normal.         Behavior: Behavior normal.       Physical Exam  Vital Signs  Heart rate was 97 when she came in, now it is down to 83.    PHQ-9 Total Score:    Results  Laboratory Studies  CBC was normal. Thyroid was normal. Vitamin D was a little bit low but improved compared to 5 months ago.         Assessment & Plan   Diagnoses and all orders for this visit:    1. Other fatigue (Primary)    2. Encounter for hepatitis C virus screening test for high risk patient    3. Vitamin D deficiency    4. Overweight with body mass index (BMI) of 27 to 27.9 in adult    5. Tachycardia, unspecified    6. Encounter for hepatitis C screening test for low risk patient  -     Hepatitis C Antibody; Future      Assessment & Plan  1. Fatigue.  Her fatigue has shown improvement, which is a positive sign. The weight gain is also encouraging, as weight loss in conjunction with fatigue could indicate more serious conditions such as cancer or tumors.    2. Tachycardia.  Her heart rate was initially 97 but decreased to 83 upon recheck. She has been seeing a therapist for anxiety, which has helped manage her pulse rate.    3. Hepatitis C screening.  She has not been screened for hepatitis C. A hepatitis C titer will be included in her next blood draw.    4. Vitamin D deficiency.  Her vitamin D levels were slightly low but have improved compared to 5 months ago. She should continue to monitor her vitamin D levels.    5. Overweight.  Her BMI is 27, placing her in the overweight category. She has experienced some weight gain, which is considered positive in this context.    6. Health maintenance.  She is advised to receive her influenza and COVID-19 vaccines,  which can be administered at the clinic with coverage from her insurance.    Follow-up  The patient will follow up in 1 year.    Patient Instructions     Health Maintenance Due   Topic Date Due    HPV VACCINES (3 - Male 2-dose series) 01/18/2017    HEPATITIS C SCREENING  Never done    INFLUENZA VACCINE  07/01/2024    COVID-19 Vaccine (4 - 2024-25 season) 09/01/2024           Patient or patient representative verbalized consent for the use of Ambient Listening during the visit with  Gabriela Carvajal MD for chart documentation. 12/31/2024  17:44 EST